# Patient Record
Sex: MALE | Race: WHITE | Employment: OTHER | URBAN - METROPOLITAN AREA
[De-identification: names, ages, dates, MRNs, and addresses within clinical notes are randomized per-mention and may not be internally consistent; named-entity substitution may affect disease eponyms.]

---

## 2017-09-27 ENCOUNTER — HOSPITAL ENCOUNTER (INPATIENT)
Age: 27
LOS: 6 days | Discharge: HOME | DRG: 754 | End: 2017-10-03
Attending: STUDENT IN AN ORGANIZED HEALTH CARE EDUCATION/TRAINING PROGRAM | Admitting: STUDENT IN AN ORGANIZED HEALTH CARE EDUCATION/TRAINING PROGRAM
Payer: MEDICAID

## 2017-09-27 ENCOUNTER — HOSPITAL ENCOUNTER (EMERGENCY)
Age: 27
Discharge: PSYCHIATRIC OR SUBSTANCE USE FACILITY | End: 2017-09-27
Attending: EMERGENCY MEDICINE
Payer: MEDICAID

## 2017-09-27 VITALS
RESPIRATION RATE: 18 BRPM | SYSTOLIC BLOOD PRESSURE: 130 MMHG | WEIGHT: 250 LBS | HEIGHT: 70 IN | BODY MASS INDEX: 35.79 KG/M2 | DIASTOLIC BLOOD PRESSURE: 72 MMHG | HEART RATE: 86 BPM | OXYGEN SATURATION: 100 % | TEMPERATURE: 98 F

## 2017-09-27 DIAGNOSIS — F32.A DEPRESSION WITH SUICIDAL IDEATION: Primary | ICD-10-CM

## 2017-09-27 DIAGNOSIS — R45.851 DEPRESSION WITH SUICIDAL IDEATION: Primary | ICD-10-CM

## 2017-09-27 PROBLEM — F33.3 MAJOR DEPRESSIVE DISORDER, RECURRENT, SEVERE WITH PSYCHOTIC FEATURES (HCC): Status: ACTIVE | Noted: 2017-09-27

## 2017-09-27 LAB
ALBUMIN SERPL-MCNC: 4.1 G/DL (ref 3.4–5)
ALBUMIN/GLOB SERPL: 1 {RATIO} (ref 0.8–1.7)
ALP SERPL-CCNC: 98 U/L (ref 45–117)
ALT SERPL-CCNC: 58 U/L (ref 16–61)
AMPHET UR QL SCN: NEGATIVE
ANION GAP SERPL CALC-SCNC: 8 MMOL/L (ref 3–18)
AST SERPL-CCNC: 33 U/L (ref 15–37)
BARBITURATES UR QL SCN: NEGATIVE
BASOPHILS # BLD: 0.1 K/UL (ref 0–0.06)
BASOPHILS NFR BLD: 1 % (ref 0–2)
BENZODIAZ UR QL: NEGATIVE
BILIRUB SERPL-MCNC: 0.4 MG/DL (ref 0.2–1)
BUN SERPL-MCNC: 17 MG/DL (ref 7–18)
BUN/CREAT SERPL: 18 (ref 12–20)
CALCIUM SERPL-MCNC: 8.9 MG/DL (ref 8.5–10.1)
CANNABINOIDS UR QL SCN: NEGATIVE
CHLORIDE SERPL-SCNC: 102 MMOL/L (ref 100–108)
CO2 SERPL-SCNC: 29 MMOL/L (ref 21–32)
COCAINE UR QL SCN: NEGATIVE
CREAT SERPL-MCNC: 0.95 MG/DL (ref 0.6–1.3)
DIFFERENTIAL METHOD BLD: ABNORMAL
EOSINOPHIL # BLD: 0.2 K/UL (ref 0–0.4)
EOSINOPHIL NFR BLD: 2 % (ref 0–5)
ERYTHROCYTE [DISTWIDTH] IN BLOOD BY AUTOMATED COUNT: 14.2 % (ref 11.6–14.5)
ETHANOL SERPL-MCNC: <3 MG/DL (ref 0–3)
GLOBULIN SER CALC-MCNC: 4 G/DL (ref 2–4)
GLUCOSE SERPL-MCNC: 102 MG/DL (ref 74–99)
HCT VFR BLD AUTO: 39.6 % (ref 36–48)
HDSCOM,HDSCOM: NORMAL
HGB BLD-MCNC: 13.4 G/DL (ref 13–16)
LITHIUM SERPL-SCNC: <0.2 MMOL/L (ref 0.6–1.2)
LYMPHOCYTES # BLD: 2.5 K/UL (ref 0.9–3.6)
LYMPHOCYTES NFR BLD: 27 % (ref 21–52)
MCH RBC QN AUTO: 28.3 PG (ref 24–34)
MCHC RBC AUTO-ENTMCNC: 33.8 G/DL (ref 31–37)
MCV RBC AUTO: 83.5 FL (ref 74–97)
METHADONE UR QL: NEGATIVE
MONOCYTES # BLD: 0.6 K/UL (ref 0.05–1.2)
MONOCYTES NFR BLD: 6 % (ref 3–10)
NEUTS SEG # BLD: 5.7 K/UL (ref 1.8–8)
NEUTS SEG NFR BLD: 64 % (ref 40–73)
OPIATES UR QL: NEGATIVE
PCP UR QL: NEGATIVE
PLATELET # BLD AUTO: 198 K/UL (ref 135–420)
PMV BLD AUTO: 10.1 FL (ref 9.2–11.8)
POTASSIUM SERPL-SCNC: 4.3 MMOL/L (ref 3.5–5.5)
PROT SERPL-MCNC: 8.1 G/DL (ref 6.4–8.2)
RBC # BLD AUTO: 4.74 M/UL (ref 4.7–5.5)
SODIUM SERPL-SCNC: 139 MMOL/L (ref 136–145)
WBC # BLD AUTO: 9.1 K/UL (ref 4.6–13.2)

## 2017-09-27 PROCEDURE — 74011250637 HC RX REV CODE- 250/637: Performed by: EMERGENCY MEDICINE

## 2017-09-27 PROCEDURE — 85025 COMPLETE CBC W/AUTO DIFF WBC: CPT | Performed by: EMERGENCY MEDICINE

## 2017-09-27 PROCEDURE — 80307 DRUG TEST PRSMV CHEM ANLYZR: CPT | Performed by: EMERGENCY MEDICINE

## 2017-09-27 PROCEDURE — 74011250637 HC RX REV CODE- 250/637: Performed by: STUDENT IN AN ORGANIZED HEALTH CARE EDUCATION/TRAINING PROGRAM

## 2017-09-27 PROCEDURE — 80053 COMPREHEN METABOLIC PANEL: CPT | Performed by: EMERGENCY MEDICINE

## 2017-09-27 PROCEDURE — 65220000003 HC RM SEMIPRIVATE PSYCH

## 2017-09-27 PROCEDURE — 99285 EMERGENCY DEPT VISIT HI MDM: CPT

## 2017-09-27 PROCEDURE — 80178 ASSAY OF LITHIUM: CPT | Performed by: EMERGENCY MEDICINE

## 2017-09-27 RX ORDER — ERGOCALCIFEROL 1.25 MG/1
50000 CAPSULE ORAL
COMMUNITY

## 2017-09-27 RX ORDER — MELATONIN
1000 DAILY
Status: DISCONTINUED | OUTPATIENT
Start: 2017-09-28 | End: 2017-10-03 | Stop reason: HOSPADM

## 2017-09-27 RX ORDER — LORAZEPAM 1 MG/1
1-2 TABLET ORAL
Status: DISCONTINUED | OUTPATIENT
Start: 2017-09-27 | End: 2017-10-03 | Stop reason: HOSPADM

## 2017-09-27 RX ORDER — ARIPIPRAZOLE 5 MG/1
5 TABLET ORAL
Status: DISCONTINUED | OUTPATIENT
Start: 2017-09-27 | End: 2017-09-30

## 2017-09-27 RX ORDER — HALOPERIDOL 5 MG/ML
5 INJECTION INTRAMUSCULAR
Status: DISCONTINUED | OUTPATIENT
Start: 2017-09-27 | End: 2017-10-03 | Stop reason: HOSPADM

## 2017-09-27 RX ORDER — ARIPIPRAZOLE 10 MG/1
10 TABLET ORAL
COMMUNITY

## 2017-09-27 RX ORDER — HALOPERIDOL 5 MG/1
5 TABLET ORAL
Status: DISCONTINUED | OUTPATIENT
Start: 2017-09-27 | End: 2017-10-03 | Stop reason: HOSPADM

## 2017-09-27 RX ORDER — TRAZODONE HYDROCHLORIDE 100 MG/1
100 TABLET ORAL
COMMUNITY

## 2017-09-27 RX ORDER — ATORVASTATIN CALCIUM 20 MG/1
10 TABLET, FILM COATED ORAL
Status: DISCONTINUED | OUTPATIENT
Start: 2017-09-27 | End: 2017-10-03 | Stop reason: HOSPADM

## 2017-09-27 RX ORDER — LITHIUM CARBONATE 300 MG
600 TABLET ORAL
Status: DISCONTINUED | OUTPATIENT
Start: 2017-09-27 | End: 2017-09-29

## 2017-09-27 RX ORDER — IBUPROFEN 400 MG/1
400 TABLET ORAL
Status: DISCONTINUED | OUTPATIENT
Start: 2017-09-27 | End: 2017-10-03 | Stop reason: HOSPADM

## 2017-09-27 RX ORDER — DULOXETIN HYDROCHLORIDE 30 MG/1
30 CAPSULE, DELAYED RELEASE ORAL DAILY
Status: DISCONTINUED | OUTPATIENT
Start: 2017-09-28 | End: 2017-10-03 | Stop reason: HOSPADM

## 2017-09-27 RX ORDER — TRAZODONE HYDROCHLORIDE 100 MG/1
100 TABLET ORAL
Status: DISCONTINUED | OUTPATIENT
Start: 2017-09-27 | End: 2017-10-03 | Stop reason: HOSPADM

## 2017-09-27 RX ORDER — DULOXETIN HYDROCHLORIDE 30 MG/1
90 CAPSULE, DELAYED RELEASE ORAL
COMMUNITY

## 2017-09-27 RX ORDER — LORAZEPAM 2 MG/ML
1-2 INJECTION INTRAMUSCULAR
Status: DISCONTINUED | OUTPATIENT
Start: 2017-09-27 | End: 2017-10-03 | Stop reason: HOSPADM

## 2017-09-27 RX ORDER — LITHIUM CARBONATE 600 MG/1
600 CAPSULE ORAL
COMMUNITY
End: 2017-10-03

## 2017-09-27 RX ORDER — ATORVASTATIN CALCIUM 10 MG/1
10 TABLET, FILM COATED ORAL DAILY
COMMUNITY

## 2017-09-27 RX ORDER — DULOXETIN HYDROCHLORIDE 60 MG/1
60 CAPSULE, DELAYED RELEASE ORAL DAILY
Status: DISCONTINUED | OUTPATIENT
Start: 2017-09-27 | End: 2017-09-27 | Stop reason: HOSPADM

## 2017-09-27 RX ADMIN — TRAZODONE HYDROCHLORIDE 100 MG: 100 TABLET ORAL at 21:13

## 2017-09-27 RX ADMIN — ATORVASTATIN CALCIUM 10 MG: 20 TABLET, FILM COATED ORAL at 21:14

## 2017-09-27 RX ADMIN — DULOXETINE HYDROCHLORIDE 60 MG: 60 CAPSULE, DELAYED RELEASE ORAL at 09:11

## 2017-09-27 RX ADMIN — LITHIUM CARBONATE 600 MG: 300 TABLET ORAL at 21:13

## 2017-09-27 RX ADMIN — ARIPIPRAZOLE 5 MG: 5 TABLET ORAL at 21:14

## 2017-09-27 NOTE — BH NOTES
Patient was admitted to unit ambulatory. He is alert and oriented to time, place and situation. He denies that he is suicidal at this moment. He stated that he is feeling depressed, and that he has been depressed all of his life. His appetite is good. He is interacting with peers. He is in milieu watching television. Will continue to monitor and provide support as needed.

## 2017-09-27 NOTE — BH NOTES
Peyton Christianson is  participating in [place]. Group time: [id number]    Personal goal for participation: Community announcement    Goal orientation: community    Group therapy participation: fully participated    Therapeutic interventions reviewed and discussed: Staff discussed  Staff discussed the Mental Health programs offered. Unit schedule for groups,  Visiting hours, patient advocate name and phone number and where this information is posted on the unit. , etc,,. Report any maintenance/housekeeping or treatment concerns to staff so it can be addressed. Impression of participation: Pt.did not have any maintenance/housekeeping or treatment concerns to report to staff .
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
## 2017-09-27 NOTE — IP AVS SNAPSHOT
[address] [facility] Patient: Barrera Islas MRN: [id number] QBW:[id number] You are allergic to the following No active allergies Immunizations Administered for This Admission Name Date Influenza Vaccine (Quad) PF 10/3/2017 Recent Documentation Smoking Status Never Smoker Emergency Contacts Name Discharge Info Relation Home Work Mobile Avery Bell NO [2] Agent [29] [phone] Savannah Castro DECLINED CAREGIVER [4] Mother [14]   [phone] About your hospitalization You were admitted on:  September 27, 2017 You last received care in the:  [facility] ADULT CHEM DEP You were discharged on:  October 3, 2017 Unit phone number:  [phone] Why you were hospitalized Your primary diagnosis was:  Not on File Your diagnoses also included:  Persistent Depressive Disorder With Atypical Features, Currently Severe, Autism Spectrum Disorder, Schizoid Personality Disorder In Adult Providers Seen During Your Hospitalizations Provider Role Specialty Primary office phone Preet Villafuerte MD Attending Provider Psychiatry [phone] Your Primary Care Physician (PCP) Primary Care Physician Office Phone Office Fax [facility] [phone] Follow-up Information Follow up With Details Comments Contact Info Romeo Lee MD   [address] [place] [address] [address] [place] [address] [phone] COMPREHENSIVE PSYCHOLOGICAL SERVICES On 10/7/2017 with Yeison Mccullough @ 1:45 pm [phone] 
[phone] Current Discharge Medication List  
  
START taking these medications Dose & Instructions Dispensing Information Comments Morning Noon Evening Bedtime  
 lithium carbonate 300 mg tablet Replaces:  lithium carbonate 600 mg capsule Your last dose was: Your next dose is:    
   
   
 Dose:  600 mg Take 2 Tabs by mouth two (2) times a day. Indications: depression Quantity:  60 Tab Refills:  0 CONTINUE these medications which have CHANGED Dose & Instructions Dispensing Information Comments Morning Noon Evening Bedtime * ABILIFY 10 mg tablet Generic drug:  ARIPiprazole What changed:  Another medication with the same name was added. Make sure you understand how and when to take each. Your last dose was: Your next dose is:    
   
   
 Dose:  10 mg Take 10 mg by mouth nightly. Refills:  0  
     
   
   
   
  
 * ARIPiprazole 10 mg tablet Commonly known as:  ABILIFY What changed: You were already taking a medication with the same name, and this prescription was added. Make sure you understand how and when to take each. Your last dose was: Your next dose is:    
   
   
 Dose:  10 mg Take 1 Tab by mouth nightly. Indications: DEPRESSION TREATMENT ADJUNCT Quantity:  30 Tab Refills:  0  
     
   
   
   
  
 * CYMBALTA 30 mg capsule Generic drug:  DULoxetine What changed:  Another medication with the same name was added. Make sure you understand how and when to take each. Your last dose was: Your next dose is:    
   
   
 Dose:  90 mg Take 90 mg by mouth nightly. Refills:  0  
     
   
   
   
  
 * DULoxetine 30 mg capsule Commonly known as:  CYMBALTA What changed: You were already taking a medication with the same name, and this prescription was added. Make sure you understand how and when to take each. Your last dose was: Your next dose is:    
   
   
 Dose:  30 mg Take 1 Cap by mouth daily. Indications: major depressive disorder Quantity:  30 Cap Refills:  0  
     
   
   
   
  
 * traZODone 100 mg tablet Commonly known as:  Paresh Funes  
 What changed:  Another medication with the same name was added. Make sure you understand how and when to take each. Your last dose was: Your next dose is:    
   
   
 Dose:  100 mg Take 100 mg by mouth nightly. Refills:  0  
     
   
   
   
  
 * traZODone 100 mg tablet Commonly known as:  Hiral Churchill What changed: You were already taking a medication with the same name, and this prescription was added. Make sure you understand how and when to take each. Your last dose was: Your next dose is:    
   
   
 Dose:  100 mg Take 1 Tab by mouth nightly. Indications: major depressive disorder Quantity:  30 Tab Refills:  0  
     
   
   
   
  
 * Notice: This list has 6 medication(s) that are the same as other medications prescribed for you. Read the directions carefully, and ask your doctor or other care provider to review them with you. CONTINUE these medications which have NOT CHANGED Dose & Instructions Dispensing Information Comments Morning Noon Evening Bedtime LIPITOR 10 mg tablet Generic drug:  atorvastatin Your last dose was: Your next dose is:    
   
   
 Dose:  10 mg Take 10 mg by mouth daily. Refills:  0  
     
   
   
   
  
 VITAMIN D2 50,000 unit capsule Generic drug:  ergocalciferol Your last dose was: Your next dose is:    
   
   
 Dose:  03442 Units Take 50,000 Units by mouth every seven (7) days. Refills:  0 STOP taking these medications   
 lithium carbonate 600 mg capsule Replaced by:  lithium carbonate 300 mg tablet ZOLOFT 50 mg tablet Generic drug:  sertraline Where to Get Your Medications Information on where to get these meds will be given to you by the nurse or doctor. ! Ask your nurse or doctor about these medications ARIPiprazole 10 mg tablet DULoxetine 30 mg capsule  
 lithium carbonate 300 mg tablet traZODone 100 mg tablet Discharge Instructions BEHAVIORAL HEALTH NURSING DISCHARGE NOTE Emergency Numbers [facility] Desk: [phone] [facility]: [phone] Suicide Prevention Line: [phone] (TALK) The following personal items collected during your admission are returned to you:  
Dental Appliance: Dental Appliances: None Vision: Visual Aid: Glasses Hearing Aid:   
Jewelry: Jewelry: None Clothing: Clothing: Footwear, Pants, Shirt Other Valuables: Other Valuables: None Valuables sent to safe: Personal Items Sent to Safe: none The discharge information has been reviewed with the patient. The patient verbalized understanding. [employer] Activation Thank you for requesting access to [employer]aiti. Please follow the instructions below to securely access and download your online medical record. [employer]aiti allows you to send messages to your doctor, view your test results, renew your prescriptions, schedule appointments, and more. How Do I Sign Up? In your internet browser, go to [email or web address] Click on the First Time User? Click Here link in the Sign In box. You will be redirect to the New Member Sign Up page. Enter your [employer]ait[employer] Access Code exactly as it appears below. You will not need to use this code after youve completed the sign-up process. If you do not sign up before the expiration date, you must request a new code. [employer] Access Code: [email or web address] Expires: 2018 12:29 PM (This is the date your dloHaiti access code will ) Enter the last four digits of your Social Security Number (xxxx) and Date of Birth (mm/dd/yyyy) as indicated and click Submit. You will be taken to the next sign-up page. Create a dloHaiti ID. This will be your dloHaiti login ID and cannot be changed, so think of one that is secure and easy to remember. Create a [employer]aiti password. You can change your password at any time. Enter your Password Reset Question and Answer. This can be used at a later time if you forget your password. Enter your e-mail address. You will receive e-mail notification when new information is available in [address] Click Sign Up. You can now view and download portions of your medical record. Click the BeeTV link to download a portable copy of your medical information. Additional Information If you have questions, please visit the Frequently Asked Questions section of the [employer] website at [email or web address] Remember, [employer] is NOT to be used for urgent needs. For medical emergencies, dial 911. Patient armband removed and shredded Discharge Orders None Introducing [facility] SERVICES! Nik Solano introduces [employer] patient portal. Now you can access parts of your medical record, email your doctor's office, and request medication refills online. 1. In your internet browser, go to [email or web address] 2. Click on the First Time User? Click Here link in the Sign In box. You will see the New Member Sign Up page. 3. Enter your [employer] Access Code exactly as it appears below. You will not need to use this code after youve completed the sign-up process. If you do not sign up before the expiration date, you must request a new code. · [employer] Access Code: [email or web address] Expires: 1/1/2018 12:29 PM 
 
4. Enter the last four digits of your Social Security Number (xxxx) and Date of Birth (mm/dd/yyyy) as indicated and click Submit. You will be taken to the next sign-up page. 5. Create a [employer] ID. This will be your [employer] login ID and cannot be changed, so think of one that is secure and easy to remember. 6. Create a [employer] password. You can change your password at any time. 7. Enter your Password Reset Question and Answer. This can be used at a later time if you forget your password. 8. Enter your e-mail address. You will receive e-mail notification when new information is available in [address] 9. Click Sign Up. You can now view and download portions of your medical record. 10. Click the Download Summary menu link to download a portable copy of your medical information. If you have questions, please visit the Frequently Asked Questions section of the [employer] website. Remember, [employer] is NOT to be used for urgent needs. For medical emergencies, dial 911. Now available from your iPhone and Android! General Information Please provide this summary of care documentation to your next provider. Patient Signature:  ____________________________________________________________ Date:  ____________________________________________________________  
  
Debbie Lagunas Provider Signature:  ____________________________________________________________ Date:  ____________________________________________________________

## 2017-09-27 NOTE — PROGRESS NOTES
Pt accepted at [facility] by Dr Cam Contreras. Pt is going to Room 125 Bed 1. Nursing to call report to [phone]. ED MD, RN and pt made aware.

## 2017-09-27 NOTE — ED NOTES
Patient states that he has pain in his head that he doesn't want to live with anymore. Denies any specific symptoms when asked directly.

## 2017-09-27 NOTE — ED PROVIDER NOTES
HPI Comments: Cara Blas is a 32 y.o. Male with h/o chronic depression, suicidal thoughts with c/o increased depression, worsening suicidal thoughts tonight. Last admitted this past July for same. States he does not think the meds help him and has not been taking those. States he has thought of taking pills, but has not attempted in the past. Desires admission for help. Sx are constant    The history is provided by the patient and medical records. Past Medical History:   Diagnosis Date    Psychiatric disorder        History reviewed. No pertinent surgical history. History reviewed. No pertinent family history. Social History     Social History    Marital status: SINGLE     Spouse name: N/A    Number of children: N/A    Years of education: N/A     Occupational History    Not on file. Social History Main Topics    Smoking status: Never Smoker    Smokeless tobacco: Never Used    Alcohol use No    Drug use: No    Sexual activity: Not on file     Other Topics Concern    Not on file     Social History Narrative         ALLERGIES: Review of patient's allergies indicates no known allergies. Review of Systems   Constitutional: Negative for fever. HENT: Negative for sore throat. Eyes: Negative for visual disturbance. Respiratory: Negative for shortness of breath. Cardiovascular: Negative for chest pain. Gastrointestinal: Negative for abdominal pain. Genitourinary: Negative for difficulty urinating. Musculoskeletal: Negative for gait problem. Skin: Negative for rash. Allergic/Immunologic: Negative for immunocompromised state. Neurological: Negative for syncope. Psychiatric/Behavioral: Positive for sleep disturbance and suicidal ideas.        Vitals:    09/27/17 0112 09/27/17 0308   BP: (!) 154/100 (!) 121/91   Pulse: 96 94   Resp: 16 22   Temp: 98.9 °F (37.2 °C) 98.2 °F (36.8 °C)   SpO2: 98% 99%   Weight: 113.4 kg (250 lb)    Height: 5' 10\" (1.778 m) Physical Exam   Constitutional: He is oriented to person, place, and time. Non-toxic appearance. He does not appear ill. No distress. HENT:   Head: Normocephalic and atraumatic. Right Ear: External ear normal.   Left Ear: External ear normal.   Nose: Nose normal.   Mouth/Throat: Oropharynx is clear and moist. No oropharyngeal exudate. Eyes: Conjunctivae are normal.   Neck: Normal range of motion. Cardiovascular: Normal rate, regular rhythm, normal heart sounds and intact distal pulses. Pulmonary/Chest: Effort normal and breath sounds normal. No respiratory distress. Abdominal: Soft. There is no tenderness. Musculoskeletal: Normal range of motion. He exhibits no edema. Neurological: He is alert and oriented to person, place, and time. Skin: Skin is warm and dry. He is not diaphoretic. Psychiatric: He is withdrawn. Thought content is not paranoid. He exhibits a depressed mood. He expresses suicidal ideation. He expresses no homicidal ideation. Nursing note and vitals reviewed. [facility]  ED Course       Procedures      Vitals:  Patient Vitals for the past 12 hrs:   Temp Pulse Resp BP SpO2   09/27/17 0308 98.2 °F (36.8 °C) 94 22 (!) 121/91 99 %   09/27/17 0112 98.9 °F (37.2 °C) 96 16 (!) 154/100 98 %         Medications ordered:   Medications   DULoxetine (CYMBALTA) capsule 60 mg (not administered)         Lab findings:  Recent Results (from the past 12 hour(s))   CBC WITH AUTOMATED DIFF    Collection Time: 09/27/17  2:22 AM   Result Value Ref Range    WBC 9.1 4.6 - 13.2 K/uL    RBC 4.74 4.70 - 5.50 M/uL    HGB 13.4 13.0 - 16.0 g/dL    HCT 39.6 36.0 - 48.0 %    MCV 83.5 74.0 - 97.0 FL    MCH 28.3 24.0 - 34.0 PG    MCHC 33.8 31.0 - 37.0 g/dL    RDW 14.2 11.6 - 14.5 %    PLATELET 721 734 - 900 K/uL    MPV 10.1 9.2 - 11.8 FL    NEUTROPHILS 64 40 - 73 %    LYMPHOCYTES 27 21 - 52 %    MONOCYTES 6 3 - 10 %    EOSINOPHILS 2 0 - 5 %    BASOPHILS 1 0 - 2 %    ABS.  NEUTROPHILS 5.7 1.8 - 8.0 K/UL ABS. LYMPHOCYTES 2.5 0.9 - 3.6 K/UL    ABS. MONOCYTES 0.6 0.05 - 1.2 K/UL    ABS. EOSINOPHILS 0.2 0.0 - 0.4 K/UL    ABS. BASOPHILS 0.1 (H) 0.0 - 0.06 K/UL    DF AUTOMATED     METABOLIC PANEL, COMPREHENSIVE    Collection Time: 09/27/17  2:22 AM   Result Value Ref Range    Sodium 139 136 - 145 mmol/L    Potassium 4.3 3.5 - 5.5 mmol/L    Chloride 102 100 - 108 mmol/L    CO2 29 21 - 32 mmol/L    Anion gap 8 3.0 - 18 mmol/L    Glucose 102 (H) 74 - 99 mg/dL    BUN 17 7.0 - 18 MG/DL    Creatinine 0.95 0.6 - 1.3 MG/DL    BUN/Creatinine ratio 18 12 - 20      GFR est AA >60 >60 ml/min/1.73m2    GFR est non-AA >60 >60 ml/min/1.73m2    Calcium 8.9 8.5 - 10.1 MG/DL    Bilirubin, total 0.4 0.2 - 1.0 MG/DL    ALT (SGPT) 58 16 - 61 U/L    AST (SGOT) 33 15 - 37 U/L    Alk. phosphatase 98 45 - 117 U/L    Protein, total 8.1 6.4 - 8.2 g/dL    Albumin 4.1 3.4 - 5.0 g/dL    Globulin 4.0 2.0 - 4.0 g/dL    A-G Ratio 1.0 0.8 - 1.7     ETHYL ALCOHOL    Collection Time: 09/27/17  2:22 AM   Result Value Ref Range    ALCOHOL(ETHYL),SERUM <3 0 - 3 MG/DL       EKG interpretation by ED Physician:      X-Ray, CT or other radiology findings or impressions:  No orders to display       Progress notes, Consult notes or additional Procedure notes:   D/w dr Carmen Favorite regarding case after his interview and rec starting the cymbalta at 60mg every day and admission  Given no insurance, will place consult for case management to review in am and assist with placement  Will turn over to dr Cheli Bah in the morning at approx 7am for further management    Reevaluation of patient:   Stable with no acute medical condition that would prevent transfer to mental health facility    Disposition:  Diagnosis:   1. Depression with suicidal ideation        Disposition: pending placement    Follow-up Information     None            Patient's Medications   Start Taking    No medications on file   Continue Taking    ARIPIPRAZOLE (ABILIFY) 10 MG TABLET    Take 10 mg by mouth nightly. ATORVASTATIN (LIPITOR) 10 MG TABLET    Take 10 mg by mouth daily. DULOXETINE (CYMBALTA) 30 MG CAPSULE    Take 90 mg by mouth nightly. ERGOCALCIFEROL (VITAMIN D2) 50,000 UNIT CAPSULE    Take 50,000 Units by mouth every seven (7) days. LITHIUM CARBONATE 600 MG CAPSULE    Take 600 mg by mouth nightly. SERTRALINE (ZOLOFT) 50 MG TABLET    Take 50 mg by mouth daily. Indications: unsure dose    TRAZODONE (DESYREL) 100 MG TABLET    Take 100 mg by mouth nightly.    These Medications have changed    No medications on file   Stop Taking    No medications on file

## 2017-09-28 PROCEDURE — 65220000003 HC RM SEMIPRIVATE PSYCH

## 2017-09-28 PROCEDURE — 74011250637 HC RX REV CODE- 250/637: Performed by: NURSE PRACTITIONER

## 2017-09-28 PROCEDURE — 74011250637 HC RX REV CODE- 250/637: Performed by: STUDENT IN AN ORGANIZED HEALTH CARE EDUCATION/TRAINING PROGRAM

## 2017-09-28 RX ORDER — KETOCONAZOLE 20 MG/G
CREAM TOPICAL 2 TIMES DAILY
Status: DISCONTINUED | OUTPATIENT
Start: 2017-09-28 | End: 2017-10-03 | Stop reason: HOSPADM

## 2017-09-28 RX ADMIN — ATORVASTATIN CALCIUM 10 MG: 20 TABLET, FILM COATED ORAL at 21:10

## 2017-09-28 RX ADMIN — TRAZODONE HYDROCHLORIDE 100 MG: 100 TABLET ORAL at 21:10

## 2017-09-28 RX ADMIN — LITHIUM CARBONATE 600 MG: 300 TABLET ORAL at 21:09

## 2017-09-28 RX ADMIN — VITAMIN D, TAB 1000IU (100/BT) 1000 UNITS: 25 TAB at 08:17

## 2017-09-28 RX ADMIN — ARIPIPRAZOLE 5 MG: 5 TABLET ORAL at 21:10

## 2017-09-28 RX ADMIN — KETOCONAZOLE: 20 CREAM TOPICAL at 21:11

## 2017-09-28 RX ADMIN — DULOXETINE HYDROCHLORIDE 30 MG: 30 CAPSULE, DELAYED RELEASE ORAL at 08:17

## 2017-09-28 NOTE — BH NOTES
GROUP THERAPY PROGRESS NOTE    Temo Saleh is participating in [address] Group    Group time: [id number]    Personal goal for participation:  Seek information on Alcohol      Goal orientation: active  Group therapy participation:   Fully participated    Therapeutic interventions reviewed and discussed: Staff encouraged Pt. To participate in Group    Impression of participation:  [address] members reviewed a film, then held an open discussion with Pt.  Pt. Unknown Curb and received feedback while in group

## 2017-09-28 NOTE — PROGRESS NOTES
Problem: Psychosis  Goal: *STG: Remains safe in hospital  Will have no unsafe behaviors during hospitalization  Outcome: Progressing Towards Goal  No unsafe behaviors  Goal: *STG/LTG: Complies with medication therapy  Will take scheduled medications daily during hospitalization  Outcome: Progressing Towards Goal  Compliant with medications    Problem: Falls - Risk of  Goal: *Absence of Falls  Will have no falls during hospitalization  Document Jerri Fall Risk and appropriate interventions in the flowsheet. Outcome: Progressing Towards Goal  Fall Risk Interventions:  No falls reported/observed           Comments:   Patient ate breakfast and lunch. He stated he was feeling down. He stated his parents are controlling and he feels like hurting himself. He states \"I like being in places like this to get away from them. \" He denies feeling like hurting himself. He denies AVH. He has been isolating to himself in the milieu. Will remain on suicide precautions. Staff will continue to monitor q15 minutes for safety. Staff and nurses will continue to provide a safe and supportive environment.

## 2017-09-28 NOTE — H&P
Psychiatry History and Physical    Subjective:     Date of Evaluation:  9/28/2017    Reason for Referral:  Chantale Brown was referred to the examiners from [facility] for SI/AH. History of Presenting Problem: 27Y/O C male in NAD well developed and nourished. Reports he is still suicidal with AH. Also c/o rash to groin. Last admit 7/17. Medical problems: Depression, Vit D def, Hyperlipidemia,     Patient Active Problem List    Diagnosis Date Noted    Major depressive disorder, recurrent, severe with psychotic features ([facility] [address] 09/27/2017     Past Medical History:   Diagnosis Date    Psychiatric disorder      No past surgical history on file. No family history on file. Social History   Substance Use Topics    Smoking status: Never Smoker    Smokeless tobacco: Never Used    Alcohol use No     Prior to Admission medications    Medication Sig Start Date End Date Taking? Authorizing Provider   ARIPiprazole (ABILIFY) 10 mg tablet Take 10 mg by mouth nightly. Yes Romelia Alvarez MD   atorvastatin (LIPITOR) 10 mg tablet Take 10 mg by mouth daily. Yes Romelia Alvarez MD   DULoxetine (CYMBALTA) 30 mg capsule Take 90 mg by mouth nightly. Yes Romelia Alvarez MD   lithium carbonate 600 mg capsule Take 600 mg by mouth nightly. Yes Romelia Alvarez MD   traZODone (DESYREL) 100 mg tablet Take 100 mg by mouth nightly. Yes Romelia Alvarez MD   ergocalciferol (VITAMIN D2) 50,000 unit capsule Take 50,000 Units by mouth every seven (7) days. Yes Romelia Alvarez MD   sertraline (ZOLOFT) 50 mg tablet Take 50 mg by mouth daily.  Indications: unsure dose   Yes Romelia Alvarez MD     No Known Allergies     Review of Systems - History obtained from chart review and the patient  General ROS: positive for  - sleep disturbance  Psychological ROS: positive for - depression, hallucinations, sleep disturbances and suicidal ideation  Respiratory ROS: positive for - cough  Cardiovascular ROS: positive for - chest pain  Gastrointestinal ROS: positive for - nausea/vomiting  Genito-Urinary ROS: no dysuria, trouble voiding, or hematuria  Musculoskeletal ROS: negative  Neurological ROS: no TIA or stroke symptoms  Dermatological ROS: rash to groin      Objective:     No data found. Mental Status exam: WNL except for    Sensorium  Alert and Oriented x 2   Orientation person and place   Relations cooperative   Eye Contact appropriate   Appearance:  age appropriate and overweight   Motor Behavior:  gait stable and within normal limits   Speech:  normal volume   Vocabulary average   Thought Process: logical   Thought Content free of delusions and hallucinations   Suicidal ideations intention   Homicidal ideations no plan  and no intention   Mood:  depressed   Affect:  anxious   Memory recent  adequate   Memory remote:  adequate   Concentration:  adequate   Abstraction:  concrete   Insight:  fair   Reliability fair   Judgment:  fair           Physical Exam:   Visit Vitals    /88 (BP 1 Location: Right arm, BP Patient Position: Sitting)    Pulse 74    Temp 97.8 °F (36.6 °C)    Resp 16     General appearance: alert, cooperative, no distress, appears stated age  Head: Normocephalic, without obvious abnormality, atraumatic  Eyes: negative  Ears: normal TM's and external ear canals AU  Nose: Nares normal. Septum midline. Mucosa normal. No drainage or sinus tenderness. Throat: Lips, mucosa, and tongue normal. Teeth and gums normal  Neck: supple, symmetrical, trachea midline, no adenopathy, thyroid: not enlarged, symmetric, no tenderness/mass/nodules, no carotid bruit and no JVD  Back: symmetric, no curvature. ROM normal. No CVA tenderness. Lungs: clear to auscultation bilaterally  Chest wall: no tenderness  Heart: regular rate and rhythm, S1, S2 normal, no murmur, click, rub or gallop  Abdomen: soft, non-tender.  Bowel sounds normal. No masses,  no organomegaly  Extremities: extremities normal, atraumatic, no cyanosis or edema  Pulses: 2+ and symmetric  Skin: Skin color, texture, turgor normal. Erythema patches to groin area -bilat with pruritus  Lymph nodes: Cervical, supraclavicular, and axillary nodes normal.  Neurologic: Grossly normal        Impression:    Active Problems:    Major depressive disorder, recurrent, severe with psychotic features ([facility] [address] (9/27/2017)          Plan:     Recommendations for Treatment/Conditions:  Psychiatric treatment recommended while in hospital  Admit to Psych services for SI/AH                                                  Depression    Referral To: Inpatient psychiatric care    Competency Statement: At the current time, the patient is competent to make informed consent regarding their current medical care and discharge planning and/or financial decisions.       [employer], [place]   9/28/2017 4:37 PM

## 2017-09-28 NOTE — BSMART NOTE
ART THERAPY GROUP PROGRESS NOTE    PATIENT SCHEDULED FOR GROUP AT: 14:15    ATTENDANCE: 1/2    PARTICIPATION LEVEL: Participates fully in the art process    ATTENTION LEVEL: Able to focus on task    FOCUS: Support     SYMBOLIC & THEMATIC CONTENT AS NOTED IN IMAGERY: He was initially unavailable for group, meeting with staff. He joined the second half and kept to himself unless directly prompted. He appeared some-what anxious while he shared and made little eye-contact initially, but became more comfortable as he spoke with increasing eye-contact. His responses were general with themes of isolation and feeling \"misunderstood,\" especially by his parents.  He claimed that he feels understood by [his] health counselor \"yessy Ortiz,\" whom he described more as \"a mentor and friend,\" as well as his psychiatrist.

## 2017-09-29 PROBLEM — F84.0 AUTISM SPECTRUM DISORDER: Status: ACTIVE | Noted: 2017-09-29

## 2017-09-29 PROBLEM — F60.1: Status: ACTIVE | Noted: 2017-09-29

## 2017-09-29 PROBLEM — F34.1: Status: ACTIVE | Noted: 2017-09-27

## 2017-09-29 PROCEDURE — 74011250637 HC RX REV CODE- 250/637: Performed by: STUDENT IN AN ORGANIZED HEALTH CARE EDUCATION/TRAINING PROGRAM

## 2017-09-29 PROCEDURE — 65220000003 HC RM SEMIPRIVATE PSYCH

## 2017-09-29 RX ORDER — LITHIUM CARBONATE 300 MG
600 TABLET ORAL 2 TIMES DAILY
Status: DISCONTINUED | OUTPATIENT
Start: 2017-09-29 | End: 2017-10-03 | Stop reason: HOSPADM

## 2017-09-29 RX ADMIN — ATORVASTATIN CALCIUM 10 MG: 20 TABLET, FILM COATED ORAL at 20:37

## 2017-09-29 RX ADMIN — ARIPIPRAZOLE 5 MG: 5 TABLET ORAL at 20:37

## 2017-09-29 RX ADMIN — KETOCONAZOLE: 20 CREAM TOPICAL at 20:39

## 2017-09-29 RX ADMIN — KETOCONAZOLE: 20 CREAM TOPICAL at 08:20

## 2017-09-29 RX ADMIN — TRAZODONE HYDROCHLORIDE 100 MG: 100 TABLET ORAL at 20:37

## 2017-09-29 RX ADMIN — LITHIUM CARBONATE 600 MG: 300 TABLET ORAL at 20:37

## 2017-09-29 RX ADMIN — DULOXETINE HYDROCHLORIDE 30 MG: 30 CAPSULE, DELAYED RELEASE ORAL at 08:19

## 2017-09-29 RX ADMIN — LITHIUM CARBONATE 600 MG: 300 TABLET ORAL at 12:35

## 2017-09-29 RX ADMIN — VITAMIN D, TAB 1000IU (100/BT) 1000 UNITS: 25 TAB at 08:20

## 2017-09-29 NOTE — PROGRESS NOTES
Problem: Suicide/Homicide (Adult/Pediatric)  Goal: *STG: Remains safe in hospital  Patient will not harm himself and will contract for safety in the hospital.   Outcome: Progressing Towards Goal  No unsafe behaviors  Goal: *STG/LTG: Complies with medication therapy  Patient will comply with medication regimen. Outcome: Progressing Towards Goal  Compliant with medications    Problem: Falls - Risk of  Goal: *Absence of Falls  Will have no falls during hospitalization  Document Jerri Fall Risk and appropriate interventions in the flowsheet. Outcome: Progressing Towards Goal  Fall Risk Interventions:  No falls reported/observed        Comments:   Patient ate breakfast this morning. He was compliant with medications. He does not appear to be responding to internal stimuli. He has been in the milieu for most of the shift with art supplies and has been isolating himself away from his peers. He has participated in two groups today. He denied SI/HI/AVH. Staff will continue to monitor q15 minutes for safety. Staff and nurses will continue to provide a safe and supportive environment. Will remain on suicide precautions.

## 2017-09-29 NOTE — BH NOTES
GROUP THERAPY PROGRESS NOTE    Ellard Primrose is participating in Recreational Therapy. Group time: 45 minutes    Personal goal for participation:  Fresh Air/Exercise/Relaxation     Goal orientation: relaxation    Group therapy participation: active    Therapeutic interventions reviewed and discussed: Fresh Air/Exercise/Relaxation     Impression of participation: Pt appeared to enjoy sitting outside in the sun with a select peer for half of recreation time. Pt  played foosball, with this writer, for the second half of recreation time. Pt's conversation was minimal, but was observed smiling.

## 2017-09-29 NOTE — BH NOTES
GROUP THERAPY PROGRESS NOTE    Precious Javier is participating in [place]. Group time: 30 minutes    Personal goal for participation: Social interaction/communication    Goal orientation: social/community    Group therapy participation: active    Therapeutic interventions reviewed and discussed: Positive social interaction and communication    Impression of participation: Pt.elevated in mood, talkative and interacting positively with peers.

## 2017-09-29 NOTE — BH NOTES
Pt. is a 32year old male with history of Depression, mild IDD and Autism Spectrum. Pt was admitted to this facility for ideations to harm self. JOSEP discussed case with the treating psychiatrist  JOSEP Contact:  SW met with pt.  to discuss the reason for this admission. Pt. stated he has been tired of living. Pt expressed he does not like his lifestyle. Pt. expressed he needs to change and fix self. Pt expressed he feels different from other people. Pt expressed he wants to feel normal.  Pt admits he hears voices . Pt stated he first started hearing voices at age 10. Pt expressed he was afraid at first.pt than stated he realized he was psychic. Pt stated felt like he was able to see into his future. Pt. stated since his teens , he has wanted to harm self. SW discussed self-efficacy, safety plan and discussed positive coping skills. SW encouraged pt to define want he meant by being normal like others. Pt expressed not being pleased about his appearance. Pt. did not want to go in detail. Pt. was getting increasing anxious. He was observed sweating a lot and breathing had increased. SW engaged with deep breathing tech. Pt stated this is not hi life. Pt stated he has to find out want his life is suppose to be. Pt stated his life is controlled by parents. Pt. stated he does not reside with his parents but they financially support him outside the home. Pt. stated he lives with roommates. Pt. expressed he likes his roommates but feels as though he is not like them. Pt. talked about both parents as being negative and triggers in his life. Pt. stated mother belittles him all the time. Pt. referred to his father as a jackass. Pt. stated he has an older brother but does not like home. Pt stated my brother was my bully growing up.  He was never there for me.   Pt. expressed admiration for his sister.  she is my hero .  She understands me.    SW discussed positive conflict resolution and positive self talk.   Pt. endorses ideations towards self. Pt. appeared anxious, guarded, cooperative, hypervigilant and oriented. SW will engage pt with CBT .

## 2017-09-29 NOTE — BH NOTES
GROUP THERAPY PROGRESS NOTE    Ag Chamberlain is participating in [place]. Group time: 30 minutes    Personal goal for participation: rules/ regulations    Goal orientation: community    Group therapy participation: active    Therapeutic interventions reviewed and discussed: He appeared receptive of this information while group was in session. Impression of participation: He was alert and oriented during group his affect appeared neutral but he appeared to be focused on getting help with his coping skills instead of being isolative and playing games in the house. He was educated on stop being isolative and finding community resources to help him outside of the house to help him.

## 2017-09-29 NOTE — BSMART NOTE
OCCUPATIONAL THERAPY PROGRESS NOTE  Group Time:  2116  Attendance: The patient attended full group. Participation:  The patient participated with moderate elaboration in the activity. Attention:  The patient was able to focus on the activity. Interaction:  The patient acknowledges others or responds to questions,  with no spontaneous interaction. Able to ID and practice positive affirmations as well as ID some of his negative thinking. [place]. Still angry at mother and still unable to ID any specific reason. Encouraged to practice his positive thinking in order to feel better.

## 2017-09-29 NOTE — BH NOTES
Pt. has been polite and cooperative in the milieu socializing with staff and peers. Pt. denies suicidal/homicidal ideations, audio/visual hallucination. Pt contracts for safety on the unit agree to come to staff if feeling harm to self or others. Pt.denies any new medical/pain complaints. Pt. ate 100% of meals and took scheduled medications. Pt. did not have any visitors. Staff encouraged Pt. to  participate in treatment,  medication and group therapy. Pt agreed. Pt. remain free of falls and provided non skid socks. Staff will continue to monitor Pt. for behavior safety and location.

## 2017-09-29 NOTE — H&P
Psychiatric Intake Note    Date: 17   Patient Name: Jayro Tinajero  : 1990  MRN: [id number]    CC: \"I can't stop thinking about death. 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 Patient seen this morning on rounds. He is somewhat bizarre with social cues and makes poor eye contact. He perseverates on death and his \"controlling\" parents, says he \"doesn't know who he is\" and wishes he could \"be himself,\" but feels his parents have \"brainwashed him\" into being something he isn't. Says he's felt suicidal \"as long as I can remember,\" says he spends hours and hours thinking about death on a regular basis and \"nothing makes it better. \" He currently has no plan or intent. He says if he could \"be himself,\" he may not be suicidal. He lives with roommates, but does not engage with peers and says he has no desire for romantic relationship. Says his sister is his best friend and \"she's a good support for me,\" hears racing thoughts in his head that tell him he is \"bad\" and [place] burden\" and several negative thoughts.        PSYCHIATRIC HISTORY:  DIAGNOSIS: depression, intellectual disabilities  OUTPATIENT PROVIDERS: has psychiatrist  HOSPITALIZATIONS: 2 prior after overdose  SUICIDE ATTEMPTS: 2- overdosing on pills and \"cutting my pinky\"  CURRENT MEDICATIONS: abilify, cymbalta, lithium, trazodone  PRIOR MEDICATIONS: \"a lot\" can't remember    PAST MEDICAL/ SURGICAL HISTORY:  Hyperlipidemia  Vitamin D deficiency    ALLERGIES: NKDA    FAMILY HISTORY OF MENTAL ILLNESS:   Mother side: unknown  Father's side: unknown  Siblings: unknown    SOCIAL HISTORY:  Childhood: grew up with older brother and younger sister and mother and father, says sister is his best friend, but does not get along well with older brother; parents are very involved in his life  Current Living Situation: lives in apt with 2 roommates  Relationship status: single, says he has no desire for relationship  Children: no  Employment: does temp work for event [address]  Education: high school, had IEP, special needs; says it was \"demoralizing\" to be in special needs classes, as he had to do several worksheets that [employer] so easy\" for him and disagrees with diagnosis of \"mental retardation\"  Legal: none  Abuse History: denies    SUBSTANCE USE:  Smoked MJ a couple times in past  Alcohol sometimes    REVIEW OF SYSTEMS: reviewed 10 organ systems- negative, except as noted in HPI    MENTAL STATUS EXAM:  Appearance: Dressed in casual clothes with fair grooming and hygiene  Behavior: cooperative, poor eye contact, little concern for social cues, bizarre  Motor: No psychomotor agitation/retardation  Speech: monotone, interrupting at times, normal volume  Mood: \"not good\"  Affect: blunted  Thought Process: linear, goal-directed, bizarre  Thought Content: +SI, denies HI, perseverations on death  Perception: Denies AH or VH  Concentration: fair  Memory: fair  Cognition: Alert and oriented  Insight: fair  Judgment: fair    RISK ASSESSMENT:   Prior Attempts: no noted prior  Lethality of Attempts: none noted prior  Current Ideation/Plan: yes  Weapons at Home: revolvers (collectibles, no bullets)  Alcohol/Drug Use: no  Protective Factors: limited, no supportive family or peer support  Future Orientation: limited    ASSESSMENT: Patient is a 33 yo male hx of intellectual disabilities, persistent depressive disorder and 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 Diagnoses:  Autistic spectrum disorder  Persistent depressive disorder  Schizoid personality disorder  Intellectual Disabilities    Plan:  1. Continue with inpatient psychiatric treatment for safety, stabilization, and medication management  2. Continue with suicide or assault precautions  3.  Patient is to continue with Art/OT and family therapy sessions  4. Will need to talk with outpatient providers for more collateral  5. Will need to talk with family/ friends for more collateral  6. Medications:  Psych  Ability 5 mg po nightly  Cymbalta 30 mg po daily  incr Lithium 600 mg po nightly--> BID (9/29/17)  Trazodone 100 mg po nightly    Med  Lipitor 10 mg po nightly  Vit D3 1,000 units daily  Ketoconazole cream    7. Labs: reviewed, Li level 9/27/17 <0.20  8. SW to help with disposition  9. ELOS 5-7 days      Richelle Persaud MD

## 2017-09-29 NOTE — PROGRESS NOTES
Psychiatric Progress Note    Date: 17  Patient Name: Britney Mullen  : 1990  MRN: [id number]  Hospital Day: 3      INTERVAL HISTORY:   Patient is more conversational today, going to groups. Discussed his low self-esteem and desires to change himself, feels like he's \"not himself. \" He says he wishes he could change his body. Admits to taking performance enhancers to change his penis, though still denies desire to be in a romantic relationship, but feels the need to change his physical appearance of his genitalia to [facility] it bigger\" and to change his chest specifically. Sleep improving, admits to a little nausea this morning. Still suicidal, perseverating on death, no plan or intent, contracts for safety on unit. MENTAL STATUS EXAM:  Appearance: Dressed in casual clothes with fair grooming and hygiene  Behavior: Cooperative with good eye contact  Motor: No psychomotor agitation/retardation  Speech: Normal rate, monotone and normal volume  Mood: \"not good\"  Affect: blunted  Thought Process: linear, perseverative on death  Thought Content: +SI, denies HI  Perception: Denies AH or VH  Concentration: fair  Memory: fair  Cognition: Alert and oriented  Insight: Fair  Judgment: Fair    RISK ASSESSMENT:   Prior Attempts: no noted prior  Lethality of Attempts: none noted prior  Current Ideation/Plan: denies  Protective Factors: limited  Future Orientation: limited    ASSESSMENT: Still suicidal, perseverating on death, more engaged with peers today. Diagnoses:  Autistic spectrum disorder  Persistent depressive disorder  Schizoid personality disorder  Intellectual Disabilities    Plan:  1. Continue with inpatient psychiatric treatment for containment, stabilization and medication management  2. Patient is to continue with group therapy  3.  Medications:  Psych  Ability 5 mg po nightly  Cymbalta 30 mg po daily  incr Lithium 600 mg po nightly--> BID (17)  Trazodone 100 mg po nightly     Med  Lipitor 10 mg po nightly  Vit D3 1,000 units daily  Ketoconazole cream  4. SW to help with disposition  5.  ELOS 5-7 days

## 2017-09-29 NOTE — BH NOTES
GROUP THERAPY PROGRESS NOTE     Maritza Christian is participating in Discharge Planning.      Group time: 30 minutes     Personal goal for participation: Explain procedures for discharge     Goal orientation: personal     Group therapy participation: passive     Therapeutic interventions reviewed and discussed: Advised that the patient must have an appointment scheduled before discharge and must be compliant with follow-up appointment.      Impression of participation: Patient listened to the information but did not participate in discussion

## 2017-09-30 PROCEDURE — 65220000003 HC RM SEMIPRIVATE PSYCH

## 2017-09-30 PROCEDURE — 74011250637 HC RX REV CODE- 250/637: Performed by: STUDENT IN AN ORGANIZED HEALTH CARE EDUCATION/TRAINING PROGRAM

## 2017-09-30 PROCEDURE — 74011250637 HC RX REV CODE- 250/637: Performed by: PSYCHIATRY & NEUROLOGY

## 2017-09-30 RX ORDER — ARIPIPRAZOLE 10 MG/1
10 TABLET ORAL
Status: DISCONTINUED | OUTPATIENT
Start: 2017-09-30 | End: 2017-10-03 | Stop reason: HOSPADM

## 2017-09-30 RX ADMIN — KETOCONAZOLE: 20 CREAM TOPICAL at 08:47

## 2017-09-30 RX ADMIN — LITHIUM CARBONATE 600 MG: 300 TABLET ORAL at 20:33

## 2017-09-30 RX ADMIN — LITHIUM CARBONATE 600 MG: 300 TABLET ORAL at 08:43

## 2017-09-30 RX ADMIN — KETOCONAZOLE: 20 CREAM TOPICAL at 21:06

## 2017-09-30 RX ADMIN — ATORVASTATIN CALCIUM 10 MG: 20 TABLET, FILM COATED ORAL at 20:33

## 2017-09-30 RX ADMIN — ARIPIPRAZOLE 10 MG: 10 TABLET ORAL at 20:32

## 2017-09-30 RX ADMIN — TRAZODONE HYDROCHLORIDE 100 MG: 100 TABLET ORAL at 20:31

## 2017-09-30 RX ADMIN — VITAMIN D, TAB 1000IU (100/BT) 1000 UNITS: 25 TAB at 08:44

## 2017-09-30 RX ADMIN — DULOXETINE HYDROCHLORIDE 30 MG: 30 CAPSULE, DELAYED RELEASE ORAL at 08:43

## 2017-09-30 NOTE — BH NOTES
Melinda Molina is participating in Leisure Activity Group. Group time: [id number]    Personal goal for participation:  Decrease Anxiety    Goal orientation: social    Group therapy participation: active    Therapeutic interventions reviewed and discussed:  Staff encouraged to  choos relaxation activities to decrease anxiety while interacting with peers    Impression of participation:  Pt. chose to , play games with peers, color adama patterns or watch a movie.

## 2017-09-30 NOTE — BH NOTES
Psychiatry progress note    Chart reviewed, patient interviewed. Patient stated he was in the hospital because something was controlling him and due to depression. Complained he struggles with problems connecting with people and communicating. Patient also perceived he had an unhealthy lifestyle. Patient described feeling helpless and \"clueless\". Patient feels nervous, scared and on guard. Patient unsure his current meds. Wonders if they are causing nausea. Patient complained voices are telling him to give up. Says his goal is to find out what is wrong with him. On exam, patient depressed with poor eye contact. Disheveled. +SI no HI +AH no VH. Insight and judgment fair. Meds Abilify 5 qHS, Cymbalta 30 qDay; Lithium 600 BID; Trazodone 100 qHS. Assessment - Autistic spectrum disorder; Persistent depressive disorder; Schizoid personality disorder; Intellectual Disabilities. Patient clinical presentation suggestive of depression with psychotic features today. Not optimally controlled. Plan is to increase Abilify to 10 qHS and continue current treatment plan.

## 2017-09-30 NOTE — BH NOTES
GROUP THERAPY PROGRESS NOTE    Berkeley Denver is participating in [place]. Group time: 30 minutes    Personal goal for participation: Active    Goal orientation: community    Group therapy participation: active    Therapeutic interventions reviewed and discussed: Introduction of Staff, Unit rules & expectations, Questions, comments, concerns    Impression of participation: Pt was receptive at this time.  No concerns at this time

## 2017-09-30 NOTE — BH NOTES
Pt has been quiet and calm this shift. Pt states that he feels better today than previous days; \"I'm feeling much better\". Pt has not conversed with peers or staff unless asked direct questions, but has participated in groups. Pt has been in the day area for the majority of the shift. Pt did not have visitors this shift, nor was he observed on the phone. Pt is med compliant, has eaten most of his dinner meal and 100% of his snack. Pt denies SI, HI, AH, and VH. Pt contracts for safety at this time.

## 2017-09-30 NOTE — BH NOTES
SHANITA Notes: pt was complaint with vitals, med's, and ate 100% of meals on shift. Pt was insolated in his room 30% of the day with encouragement from staff and coming out for meals and groups. Pt later was seen in milieu to his self but approachable from peers and staff. Pt did not voice any negative self harm or thoughts and will continue to be monitored for safety precautions and locations.

## 2017-10-01 LAB — TSH SERPL DL<=0.05 MIU/L-ACNC: 2.14 UIU/ML (ref 0.36–3.74)

## 2017-10-01 PROCEDURE — 74011250637 HC RX REV CODE- 250/637: Performed by: STUDENT IN AN ORGANIZED HEALTH CARE EDUCATION/TRAINING PROGRAM

## 2017-10-01 PROCEDURE — 84443 ASSAY THYROID STIM HORMONE: CPT | Performed by: PSYCHIATRY & NEUROLOGY

## 2017-10-01 PROCEDURE — 74011250637 HC RX REV CODE- 250/637: Performed by: PSYCHIATRY & NEUROLOGY

## 2017-10-01 PROCEDURE — 36415 COLL VENOUS BLD VENIPUNCTURE: CPT | Performed by: PSYCHIATRY & NEUROLOGY

## 2017-10-01 PROCEDURE — 65220000003 HC RM SEMIPRIVATE PSYCH

## 2017-10-01 RX ADMIN — LITHIUM CARBONATE 600 MG: 300 TABLET ORAL at 20:15

## 2017-10-01 RX ADMIN — TRAZODONE HYDROCHLORIDE 100 MG: 100 TABLET ORAL at 20:16

## 2017-10-01 RX ADMIN — LITHIUM CARBONATE 600 MG: 300 TABLET ORAL at 08:50

## 2017-10-01 RX ADMIN — ATORVASTATIN CALCIUM 10 MG: 20 TABLET, FILM COATED ORAL at 20:16

## 2017-10-01 RX ADMIN — KETOCONAZOLE: 20 CREAM TOPICAL at 08:51

## 2017-10-01 RX ADMIN — VITAMIN D, TAB 1000IU (100/BT) 1000 UNITS: 25 TAB at 08:50

## 2017-10-01 RX ADMIN — DULOXETINE HYDROCHLORIDE 30 MG: 30 CAPSULE, DELAYED RELEASE ORAL at 08:50

## 2017-10-01 RX ADMIN — ARIPIPRAZOLE 10 MG: 10 TABLET ORAL at 20:16

## 2017-10-01 RX ADMIN — KETOCONAZOLE: 20 CREAM TOPICAL at 20:18

## 2017-10-01 NOTE — BH NOTES
Pt. is a 32year old male with history of Depression, mild IDD and Autism Spectrum. Pt was admitted to this facility for ideations to harm self. SW Contact:  SW met with pt.  to discuss d/c planning. Pt expressed he was okay. Pt. expressed he continues to hear voices. Pt is currently denying. Pt. was less anxious today. Pt expressed he met new people. Pt. expressed he feels that medication can fix him but is not sure. Pt. expressed he likes the people he has met at the hospital.  SW had pt to focus his goal on medication compliance and  safety plan. Pt. expressed he is okay but still hears voices today. Pt. Receives outpt mental health with Dr. Mya Gee.

## 2017-10-01 NOTE — BH NOTES
Psychiatry progress note     Chart reviewed, patient interviewed. Patient says he feels a little better today although he had some trouble sleeping. Unsure what meds he is talking. Feels his dreams have returned and this is a good sign. Patient complained of hearing negative voices telling he can't do things. Interested in medical tests to look at reasons for depression. Patient stated he is working on his social skills by talking to other patients on the units and staff.     On exam, patient depressed but mood and eye contact improved. No SI, no hi. +AH no VH. Insight and judgment fair.     Meds Abilify 10 qHS, Cymbalta 30 qDay; Lithium 600 BID; Trazodone 100 qHS.     Assessment - Depression with psychotic features; Persistent depressive disorder; Autistic spectrum disorder; Schizoid personality disorder; Intellectual Disabilities. Mild improvement over past 24 hours but not optimally controlled. Plan is to continue current treatment plan. Patient had normal CBC, CMP. Will check TSH to r/o underlying contributing factor to depression.

## 2017-10-01 NOTE — BH NOTES
Upon entering the unit pt was in bed and appeared to be asleep. Pt had vital signs, breakfast and lunch on this shift. Pt interacted well will staff and peers. He watched a movie and then rested in his room throughout the shift. Pt was malodorous, staff encouraged a shower and offered supplies however he was noncompliant. Pt had no questions or concerns for staff. Pt had no falls and staff will continue to monitor for safety.

## 2017-10-01 NOTE — PROGRESS NOTES
Problem: Suicide/Homicide (Adult/Pediatric)  Goal: *STG: Remains safe in hospital  Patient will not harm himself and will contract for safety in the hospital.   Outcome: Progressing Towards Goal  Patient has not displayed any unsafe behavior during this shift. Goal: *STG: Seeks staff when feelings of self harm or harm towards others arise  Patient will seek staff when feeling of self harm arise and will contract for safety with staff every shift. Outcome: Progressing Towards Goal  Patient encouraged to seek staff support if he is not able to contract for safety. Goal: *STG/LTG: Complies with medication therapy  Patient will comply with medication regimen. Outcome: Progressing Towards Goal  Patient has bee compliant with scheduled medication. Comments:   Patient was cooperative during 1:1. Patient participated in groups and activities during shift. Patient endorsed having auditory hallucinations, but was able to contract for safety. Patient has not displayed any unsafe behaviors during shift. He stated that he hears voices all the time. Denies V/H. Patient was complaint with scheduled medication and did not voice any adverse effects. Patient encouraged to see staff support if he is not able to be safe during hospitalization. Patient had positive interactions with peers and staff. Will continue to monitor for safety and encourage the use of positive coping skills.

## 2017-10-02 LAB — LITHIUM SERPL-SCNC: 0.76 MMOL/L (ref 0.6–1.2)

## 2017-10-02 PROCEDURE — 65220000003 HC RM SEMIPRIVATE PSYCH

## 2017-10-02 PROCEDURE — 36415 COLL VENOUS BLD VENIPUNCTURE: CPT | Performed by: STUDENT IN AN ORGANIZED HEALTH CARE EDUCATION/TRAINING PROGRAM

## 2017-10-02 PROCEDURE — 74011250637 HC RX REV CODE- 250/637: Performed by: STUDENT IN AN ORGANIZED HEALTH CARE EDUCATION/TRAINING PROGRAM

## 2017-10-02 PROCEDURE — 74011250637 HC RX REV CODE- 250/637: Performed by: PSYCHIATRY & NEUROLOGY

## 2017-10-02 PROCEDURE — 80178 ASSAY OF LITHIUM: CPT | Performed by: STUDENT IN AN ORGANIZED HEALTH CARE EDUCATION/TRAINING PROGRAM

## 2017-10-02 RX ADMIN — ARIPIPRAZOLE 10 MG: 10 TABLET ORAL at 20:16

## 2017-10-02 RX ADMIN — TRAZODONE HYDROCHLORIDE 100 MG: 100 TABLET ORAL at 20:16

## 2017-10-02 RX ADMIN — DULOXETINE HYDROCHLORIDE 30 MG: 30 CAPSULE, DELAYED RELEASE ORAL at 09:14

## 2017-10-02 RX ADMIN — ATORVASTATIN CALCIUM 10 MG: 20 TABLET, FILM COATED ORAL at 20:16

## 2017-10-02 RX ADMIN — VITAMIN D, TAB 1000IU (100/BT) 1000 UNITS: 25 TAB at 09:14

## 2017-10-02 RX ADMIN — KETOCONAZOLE: 20 CREAM TOPICAL at 20:18

## 2017-10-02 RX ADMIN — LITHIUM CARBONATE 600 MG: 300 TABLET ORAL at 20:15

## 2017-10-02 RX ADMIN — LITHIUM CARBONATE 600 MG: 300 TABLET ORAL at 09:14

## 2017-10-02 RX ADMIN — KETOCONAZOLE: 20 CREAM TOPICAL at 10:11

## 2017-10-02 NOTE — BSMART NOTE
OCCUPATIONAL THERAPY PROGRESS NOTE  Group Time:  2915  Attendance: The patient attended full group. Participation:  The patient participated with moderate elaboration in the activity. Attention:  The patient was able to focus on the activity. Interaction:  The patient acknowledges others or responds to questions,  with no spontaneous interaction. Participated as called on. En Borja he is not as upset with mother as he was. [place].

## 2017-10-02 NOTE — BH NOTES
GROUP THERAPY PROGRESS NOTE    Brian Villafana is participating in Recreational Therapy.      Group time: 1 hour    Personal goal for participation: patient went outside    Goal orientation: relaxation    Group therapy participation: active    Therapeutic interventions reviewed and discussed:     Impression of participation:

## 2017-10-02 NOTE — PROGRESS NOTES
Problem: Suicide/Homicide (Adult/Pediatric)  Goal: *STG: Remains safe in hospital  Patient will not harm himself and will contract for safety in the hospital.   Outcome: Progressing Towards Goal  Patient has not shown any unsafe behaviors during shift so far. Goal: *STG: Attends activities and groups  Patient will attend at least 3 activities and groups daily to aid in developing coping skills. Outcome: Progressing Towards Goal  Patient encouraged to participate in 3-4 groups during hospitalization. Goal: *STG/LTG: Complies with medication therapy  Patient will comply with medication regimen. Outcome: Progressing Towards Goal  Patient was compliant with medications during shift. Comments:   Patient cooperative during 1:1. Patient compliant with unit protocol and participated in groups and activities. Patient was compliant with evening medications. He denies SI/HI. Patient stated that he is having auditory hallucinations, but was able to contract for safety. Patient stated that he feels depressed \" Its an ongoing thing\". Will continue to monitor for safety and encourage the use of positive coping skills.

## 2017-10-02 NOTE — PROGRESS NOTES
Problem: Suicide/Homicide (Adult/Pediatric)  Goal: *STG: Remains safe in hospital  Patient will not harm himself and will contract for safety in the hospital.   Outcome: Progressing Towards Goal  Been no self injurious behaviors. Goal: *STG/LTG: Complies with medication therapy  Patient will comply with medication regimen. Outcome: Progressing Towards Goal  Remains med complaint. Problem: Psychosis  Goal: *STG: Decreased hallucinations  Will have decreased hallucinations daily during hospitalization   Outcome: Not Progressing Towards Goal  Still endorsing auditory hallucinations. Comments:   Pt was lying quietly in bed when this writer approached him for 1:1, he was cooperative. Stated still being depressed ' my life sucks \". Asked him regarding his life sucks, stated he \" been through a lot, it's hard to explain \". Denied suicidal thoughts and plans at present \" not right now \". Stated still endorsing voices \" that keep me depressed \", but not command. Med and diet compliant so far this shift.

## 2017-10-02 NOTE — BH NOTES
Patient ate dinner and intreacted with other patient. Patient appear to be happy and played various games with other patients. Patient went to groups with other patients. Patient took night time medications. Patient colored with one other patient. Patient had a snack. Patient involved in no falls this shift, Skid proof footwear utilized. Patient is safe on the unit.

## 2017-10-02 NOTE — PROGRESS NOTES
Psychiatric Progress Note    Date: 10/02/17  Patient Name: Ana Paula Lira  : 1990  MRN: [id number]  Hospital Day: 6      INTERVAL HISTORY:   Patient is more conversational today, outgoing with more peers and staff. Tearful at times in interview today, saying he \"doesn't know how to change,\" and \"I just want to feel better, but I feel like this darkness is a part of me. \" He admits to ongoing negative thoughts and perseverative, with some bizarre death illusions and thoughts. Sleep improving, no side effects reported. Still suicidal, perseverating on death, no plan or intent, contracts for safety on unit. MENTAL STATUS EXAM:  Appearance: Dressed in casual clothes, showered this morning  Behavior: Cooperative with good eye contact  Motor: No psychomotor agitation/retardation  Speech: Normal rate, monotone and normal volume  Mood: \"not good\"  Affect: blunted  Thought Process: linear, perseverative on death  Thought Content: +SI, denies HI  Perception: Denies AH or VH  Concentration: fair  Memory: fair  Cognition: Alert and oriented  Insight: Fair  Judgment: Fair    RISK ASSESSMENT:   Prior Attempts: no noted prior  Lethality of Attempts: none noted prior  Current Ideation/Plan: denies  Protective Factors: limited  Future Orientation: limited    ASSESSMENT: Still suicidal, perseverating on death, more engaged with peers today. Diagnoses:  Autistic spectrum disorder  Persistent depressive disorder  Schizoid personality disorder  Intellectual Disabilities    Plan:  1. Continue with inpatient psychiatric treatment for containment, stabilization and medication management  2. Patient is to continue with group therapy  3. Medications:  Psych  Ability 5 mg po nightly  Cymbalta 30 mg po daily  Lithium 600 mg po  BID (incr 17)  Trazodone 100 mg po nightly     Med  Lipitor 10 mg po nightly  Vit D3 1,000 units daily  Ketoconazole cream  4. Labs: Li level 10/2/17 0.76  5. SW to help with disposition  6.  LUISA 5-7 days

## 2017-10-02 NOTE — BH NOTES
Jim Gilbert is not participating in Music Therapy. Group time: 3987-1909    Personal goal for participation:  Relax while listening to Aromatherapy music    Goal orientation: relaxation    Group therapy participation: refuse    Therapeutic interventions reviewed and discussed: Staff encouraged Pt. To participate in listening to soft music    Impression of participation:  Pt. Refuse chose to  Participate in recreation off the unit.

## 2017-10-03 VITALS
TEMPERATURE: 97.3 F | DIASTOLIC BLOOD PRESSURE: 79 MMHG | HEART RATE: 70 BPM | SYSTOLIC BLOOD PRESSURE: 133 MMHG | RESPIRATION RATE: 20 BRPM

## 2017-10-03 PROCEDURE — 90471 IMMUNIZATION ADMIN: CPT

## 2017-10-03 PROCEDURE — 90686 IIV4 VACC NO PRSV 0.5 ML IM: CPT | Performed by: STUDENT IN AN ORGANIZED HEALTH CARE EDUCATION/TRAINING PROGRAM

## 2017-10-03 PROCEDURE — 74011250637 HC RX REV CODE- 250/637: Performed by: STUDENT IN AN ORGANIZED HEALTH CARE EDUCATION/TRAINING PROGRAM

## 2017-10-03 PROCEDURE — 74011250636 HC RX REV CODE- 250/636: Performed by: STUDENT IN AN ORGANIZED HEALTH CARE EDUCATION/TRAINING PROGRAM

## 2017-10-03 RX ORDER — DULOXETIN HYDROCHLORIDE 30 MG/1
30 CAPSULE, DELAYED RELEASE ORAL DAILY
Qty: 30 CAP | Refills: 0 | Status: SHIPPED | OUTPATIENT
Start: 2017-10-03

## 2017-10-03 RX ORDER — TRAZODONE HYDROCHLORIDE 100 MG/1
100 TABLET ORAL
Qty: 30 TAB | Refills: 0 | Status: SHIPPED | OUTPATIENT
Start: 2017-10-03

## 2017-10-03 RX ORDER — ARIPIPRAZOLE 10 MG/1
10 TABLET ORAL
Qty: 30 TAB | Refills: 0 | Status: SHIPPED | OUTPATIENT
Start: 2017-10-03

## 2017-10-03 RX ORDER — LITHIUM CARBONATE 300 MG
600 TABLET ORAL 2 TIMES DAILY
Qty: 60 TAB | Refills: 0 | Status: SHIPPED | OUTPATIENT
Start: 2017-10-03

## 2017-10-03 RX ADMIN — DULOXETINE HYDROCHLORIDE 30 MG: 30 CAPSULE, DELAYED RELEASE ORAL at 08:27

## 2017-10-03 RX ADMIN — INFLUENZA VIRUS VACCINE 0.5 ML: 15; 15; 15; 15 SUSPENSION INTRAMUSCULAR at 11:22

## 2017-10-03 RX ADMIN — VITAMIN D, TAB 1000IU (100/BT) 1000 UNITS: 25 TAB at 08:27

## 2017-10-03 RX ADMIN — KETOCONAZOLE: 20 CREAM TOPICAL at 08:28

## 2017-10-03 RX ADMIN — LITHIUM CARBONATE 600 MG: 300 TABLET ORAL at 08:27

## 2017-10-03 NOTE — BH NOTES
Patient is being discharged off unit with his belongings, discharge paperwork and prescriptions. Patient is being picked up by his .

## 2017-10-03 NOTE — DISCHARGE INSTRUCTIONS
BEHAVIORAL HEALTH NURSING DISCHARGE NOTE      Emergency Numbers    : [facility] Emergency Services: [phone]  Suicide Prevention Line: [phone] (TALK)      The following personal items collected during your admission are returned to you:   Dental Appliance: Dental Appliances: None  Vision: Visual Aid: Glasses  Hearing Aid:    Jewelry: Jewelry: None  Clothing: Clothing: Footwear, Pants, Shirt  Other Valuables: Other Valuables: None  Valuables sent to safe: Personal Items Sent to Safe: none        The discharge information has been reviewed with the patient. The patient verbalized understanding. [employer] Activation    Thank you for requesting access to [facility]. Please follow the instructions below to securely access and download your online medical record. [facility] allows you to send messages to your doctor, view your test results, renew your prescriptions, schedule appointments, and more. How Do I Sign Up? In your internet browser, go to [email or web address]  Click on the First Time User? Click Here link in the Sign In box. You will be redirect to the New Member Sign Up page. Enter your [facility] Access Code exactly as it appears below. You will not need to use this code after youve completed the sign-up process. If you do not sign up before the expiration date, you must request a new code. [facility] Access Code: [email or web address]  Expires: 2018 12:29 PM (This is the date your [facility] access code will )    Enter the last four digits of your Social Security Number (xxxx) and Date of Birth (mm/dd/yyyy) as indicated and click Submit. You will be taken to the next sign-up page. Create a [facility] ID. This will be your [facility] login ID and cannot be changed, so think of one that is secure and easy to remember. Create a [facility] password. You can change your password at any time. Enter your Password Reset Question and Answer.  This can be used at a later time if you forget your password. Enter your e-mail address. You will receive e-mail notification when new information is available in [address] Click Sign Up. You can now view and download portions of your medical record. Click the DocbookMD link to download a portable copy of your medical information. Additional Information    If you have questions, please visit the Frequently Asked Questions section of the [facility] website at [email or web address] [place]/. Remember, [facility] is NOT to be used for urgent needs. For medical emergencies, dial 911.     Patient armband removed and shredded

## 2017-10-03 NOTE — BH NOTES
Carson Shukla is participating in [facility] [facility]. Group time: [id number]    Personal goal for participation: coping skill to decrease self harm    Goal orientation: community    Group therapy participation: active    Therapeutic interventions reviewed and discussed: Staff encouraged pt.  To participate in learning about coping skills to prevent self harm    Impression of participation: Pt. fully participated in [address] [place] [address] questions about self harm and coping skills

## 2017-10-03 NOTE — BH NOTES
Pt. is a 32year old male with history of Depression, mild IDD and Autism Spectrum. Pt was admitted to this facility for ideations to harm self. SW discussed case with the treating psychiatrist.    JOSEP Contact:  SW met with pt.  to discuss d/c plan. Pt. denies ideations and hallucinations. SW discussed self-efficacy, safety plan and aftercare. SW assisted pt with aftercare appointment. Pt was encourage to continue to follow-up aftercare with  [facility]. Pt. Has an appointment with NP on 10/7/17. Pt. At one time was seeing [facility] for counseling. The practice does not provide medication management. Pt. Was encouraged to followed thru with appointment for counseling at [facility] . Pt.'s mental health counselor picked pt up.

## 2017-10-04 NOTE — DISCHARGE SUMMARY
Psychiatric Discharge Summary    Date: 10/04/17   Patient Name: Jung Lucero  : 1990  MRN: [id number]  Admission Date: 2017  Discharge Date: 10/3/2017    HISTORY OF PRESENT ILLNESS:   Patient is a 33 yo male hx of persistent depressive disorder and 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     Patient seen this morning on rounds. He is somewhat bizarre with social cues and makes poor eye contact. He perseverates on death and his \"controlling\" parents, says he \"doesn't know who he is\" and wishes he could \"be himself,\" but feels his parents have \"brainwashed him\" into being something he isn't. Says he's felt suicidal \"as long as I can remember,\" says he spends hours and hours thinking about death on a regular basis and \"nothing makes it better. \" He currently has no plan or intent. He says if he could \"be himself,\" he may not be suicidal. He lives with roommates, but does not engage with peers and says he has no desire for romantic relationship. Says his sister is his best friend and \"she's a good support for me,\" hears racing thoughts in his head that tell him he is \"bad\" and [place] burden\" and several negative thoughts.   [address]:   Once on the unit, patient was initially very negative, depressed, pessimistic, with poor social behaviors. Restarted on home meds, likely not taking meds at home as lithium level was subtherapeutic at 0.2. Lithium was increased to 600 mg po bid from once daily. Li level rechecked and was therapeutic at 0.74. He went to groups, got along well with peers, felt understood and mood improved with social interactions. On day of discharge, patient was Stable, calm, cooperative, not suicidal, not homicidal, not psychotic.     MENTAL STATUS EXAM:  Orientation: oriented to person, place, time, and situation  Appearance: Dressed in hospital gown with fair grooming and hygiene  Behavior: Cooperative with good eye contact  Motor: No psychomotor agitation/retardation  Speech: Normal rate, tone and volume  Mood: \"much better\"  Affect: full  Thought Process: linear, goal-directed  Thought Content: Denies SI and HI  Perception: Denies AH or VH  Concentration: fair  Memory: fair  Cognition: Alert and oriented  Insight: fair  Judgment: fair    ASSESSMENT at time of discharge:   Stable, calm, cooperative, not suicidal, not homicidal, not psychotic    Diagnoses:  Autistic spectrum disorder  Persistent depressive disorder  Schizoid personality disorder  Intellectual Disabilities    Discharge Instructions:  1. Continue psychiatric medications of abilify 5 mg po daily, cymbalta 30 mg po daily, lithium 600 mg po nightly, trazodone 100 mg po nightly  2. Please make all follow up appointments with doctors and , as provided by inpatient behavioral health . 3. If you feel unsafe or begin experiencing suicidal thoughts again, please call 9-1-1 or return to the nearest emergency department. Disposition:  Home with outpatient follow-up      Richelle Agosto MD

## 2022-03-18 PROBLEM — F34.1: Status: ACTIVE | Noted: 2017-09-27

## 2022-03-19 PROBLEM — F60.1: Status: ACTIVE | Noted: 2017-09-29

## 2022-03-19 PROBLEM — F84.0 AUTISM SPECTRUM DISORDER: Status: ACTIVE | Noted: 2017-09-29

## 2023-05-22 RX ORDER — TRAZODONE HYDROCHLORIDE 100 MG/1
100 TABLET ORAL NIGHTLY
COMMUNITY
Start: 2017-10-03

## 2023-05-22 RX ORDER — DULOXETIN HYDROCHLORIDE 30 MG/1
90 CAPSULE, DELAYED RELEASE ORAL
COMMUNITY
Start: 2017-10-03

## 2023-05-22 RX ORDER — ARIPIPRAZOLE 10 MG/1
10 TABLET ORAL NIGHTLY
COMMUNITY
Start: 2017-10-03

## 2023-05-22 RX ORDER — ERGOCALCIFEROL 1.25 MG/1
50000 CAPSULE ORAL
COMMUNITY

## 2023-05-22 RX ORDER — LITHIUM CARBONATE 300 MG
600 TABLET ORAL 2 TIMES DAILY
COMMUNITY
Start: 2017-10-03

## 2023-05-22 RX ORDER — ATORVASTATIN CALCIUM 10 MG/1
10 TABLET, FILM COATED ORAL DAILY
COMMUNITY
